# Patient Record
Sex: FEMALE | Race: BLACK OR AFRICAN AMERICAN | NOT HISPANIC OR LATINO | Employment: OTHER | URBAN - METROPOLITAN AREA
[De-identification: names, ages, dates, MRNs, and addresses within clinical notes are randomized per-mention and may not be internally consistent; named-entity substitution may affect disease eponyms.]

---

## 2021-10-13 ENCOUNTER — OFFICE VISIT (OUTPATIENT)
Dept: GASTROENTEROLOGY | Facility: CLINIC | Age: 71
End: 2021-10-13

## 2021-10-13 ENCOUNTER — PREP FOR SURGERY (OUTPATIENT)
Dept: SURGERY | Facility: SURGERY CENTER | Age: 71
End: 2021-10-13

## 2021-10-13 VITALS
HEIGHT: 64 IN | TEMPERATURE: 96.8 F | HEART RATE: 83 BPM | SYSTOLIC BLOOD PRESSURE: 130 MMHG | BODY MASS INDEX: 37.49 KG/M2 | OXYGEN SATURATION: 97 % | WEIGHT: 219.6 LBS | DIASTOLIC BLOOD PRESSURE: 80 MMHG

## 2021-10-13 DIAGNOSIS — R13.19 ESOPHAGEAL DYSPHAGIA: Primary | ICD-10-CM

## 2021-10-13 DIAGNOSIS — R10.84 GENERALIZED ABDOMINAL PAIN: ICD-10-CM

## 2021-10-13 PROCEDURE — 99204 OFFICE O/P NEW MOD 45 MIN: CPT | Performed by: NURSE PRACTITIONER

## 2021-10-13 RX ORDER — SODIUM CHLORIDE 0.9 % (FLUSH) 0.9 %
10 SYRINGE (ML) INJECTION AS NEEDED
Status: CANCELLED | OUTPATIENT
Start: 2021-10-13

## 2021-10-13 RX ORDER — LOSARTAN POTASSIUM AND HYDROCHLOROTHIAZIDE 12.5; 1 MG/1; MG/1
1 TABLET ORAL DAILY
COMMUNITY

## 2021-10-13 RX ORDER — ASCORBIC ACID 250 MG
250 TABLET ORAL DAILY
COMMUNITY

## 2021-10-13 RX ORDER — MELATONIN
1000 DAILY
COMMUNITY

## 2021-10-13 RX ORDER — THIAMINE HCL 100 MG
TABLET ORAL DAILY
COMMUNITY

## 2021-10-13 RX ORDER — FAMOTIDINE 40 MG/1
40 TABLET, FILM COATED ORAL DAILY
Qty: 30 TABLET | Refills: 5 | Status: SHIPPED | OUTPATIENT
Start: 2021-10-13 | End: 2022-04-07

## 2021-10-13 RX ORDER — SODIUM CHLORIDE, SODIUM LACTATE, POTASSIUM CHLORIDE, CALCIUM CHLORIDE 600; 310; 30; 20 MG/100ML; MG/100ML; MG/100ML; MG/100ML
30 INJECTION, SOLUTION INTRAVENOUS CONTINUOUS PRN
Status: CANCELLED | OUTPATIENT
Start: 2021-10-13

## 2021-10-13 RX ORDER — TOLTERODINE 4 MG/1
CAPSULE, EXTENDED RELEASE ORAL
Status: ON HOLD | COMMUNITY
Start: 2021-08-31 | End: 2021-12-21

## 2021-10-13 RX ORDER — SODIUM CHLORIDE 0.9 % (FLUSH) 0.9 %
3 SYRINGE (ML) INJECTION EVERY 12 HOURS SCHEDULED
Status: CANCELLED | OUTPATIENT
Start: 2021-10-13

## 2021-10-13 NOTE — PROGRESS NOTES
"No chief complaint on file.          History of Present Illness  Patient presents today for abdominal pain and sensation of food getting stuck in her upper chest.  She reports longstanding GI symptoms of abdominal pain in her mid abdomen described as a pain.  This comes and goes.  No specific food triggers.  No change with bowel habits.  No associated diarrhea or constipation.  Pain is nonspecific.  No associated nausea or vomiting.  Pain is more frequent.  Pain does not radiate.    She denies melena or hematochezia but can have diarrhea at times.    She also has difficulty swallowing.  This has been present for a wild but is worsening and becoming more often.  She has trouble swallowing solid foods and will have forced regurgitation a couple of times a month.  She does not have difficulty with liquids.  She will have regurgitation with belching as well.    She does not have epigastric pain or reflux symptoms.    She denies fever or chills.    Previous colonoscopy for colon cancer screening June 2014 was normal.      There is no family history of colon cancer or colon polyps.    She has been on ranitidine in the past but this was discontinued due to manufacture recall.  She did not notice any change in symptoms after discontinuing the medication.    She is up-to-date with her primary care provider.    Result Review :       PHYSICIAN PROGRESS NOTES - SCAN - [facility] 08/31/2021 (08/31/2021)       Vital Signs:   /80   Pulse 83   Temp 96.8 °F (36 °C)   Ht 162.6 cm (64\")   Wt 99.6 kg (219 lb 9.6 oz)   SpO2 97%   BMI 37.69 kg/m²     Body mass index is 37.69 kg/m².     Physical Exam  Vitals reviewed.   Constitutional:       General: She is not in acute distress.     Appearance: Normal appearance. She is not ill-appearing.   Pulmonary:      Effort: Pulmonary effort is normal. No respiratory distress.   Abdominal:      General: Bowel sounds are normal. There is no distension.      Palpations: Abdomen is soft. " Abdomen is not rigid. There is no mass or pulsatile mass.      Tenderness: There is no abdominal tenderness. There is no guarding or rebound.   Musculoskeletal:      Cervical back: No rigidity.   Lymphadenopathy:      Cervical: No cervical adenopathy.   Neurological:      Mental Status: She is alert.       Assessment and Plan    Diagnoses and all orders for this visit:    1. Esophageal dysphagia (Primary)    2. Generalized abdominal pain       For trouble swallowing, will start famotidine 40 mg once daily.  Will proceed with esophagram to delineate esophageal anatomy and proceed with EGD, orders placed.    For abdominal pain, diffuse, recommend colonoscopy.  Orders placed.    Based on EGD and colonoscopy findings, recommend follow-up visit for review of test results and make additional recommendations based on symptoms.    If esophagram shows a significant stricture, will separate EGD and colonoscopy.    Some modifications to help while you are having trouble swallowing include:   Avoiding highly textured foods such as meats and bulky foods such as bagels and breads.   Cutting food into smaller pieces.   Extensively chew foods.   Washing foods down with liquids and eating more slowly.  Also purposefully eating which includes avoiding distractions or talking while focusing on chewing completely and purposefully.    Patient verbalized agreement and understanding with the above plan, all questions answered and support provided.          Patient Instructions   For trouble swallowing, will start famotidine 40 mg once daily.  Will proceed with esophagram to delineate esophageal anatomy and proceed with EGD, orders placed.    For abdominal pain, diffuse, recommend colonoscopy.  Orders placed.    Based on EGD and colonoscopy findings, recommend follow-up visit for review of test results and make additional recommendations based on symptoms.    If esophagram shows a significant stricture, will separate EGD and  colonoscopy.    Some modifications to help while you are having trouble swallowing include:   Avoiding highly textured foods such as meats and bulky foods such as bagels and breads.   Cutting food into smaller pieces.   Extensively chew foods.   Washing foods down with liquids and eating more slowly.  Also purposefully eating which includes avoiding distractions or talking while focusing on chewing completely and purposefully.    Patient verbalized agreement and understanding with the above plan, all questions answered and support provided.          EMR Dragon/Transcription Disclaimer:  This document has been Dictated utilizing Dragon dictation.

## 2021-10-13 NOTE — PATIENT INSTRUCTIONS
For trouble swallowing, will start famotidine 40 mg once daily.  Will proceed with esophagram to delineate esophageal anatomy and proceed with EGD, orders placed.    For abdominal pain, diffuse, recommend colonoscopy.  Orders placed.    Based on EGD and colonoscopy findings, recommend follow-up visit for review of test results and make additional recommendations based on symptoms.    If esophagram shows a significant stricture, will separate EGD and colonoscopy.    Some modifications to help while you are having trouble swallowing include:   Avoiding highly textured foods such as meats and bulky foods such as bagels and breads.   Cutting food into smaller pieces.   Extensively chew foods.   Washing foods down with liquids and eating more slowly.  Also purposefully eating which includes avoiding distractions or talking while focusing on chewing completely and purposefully.    Patient verbalized agreement and understanding with the above plan, all questions answered and support provided.

## 2021-10-26 ENCOUNTER — HOSPITAL ENCOUNTER (OUTPATIENT)
Dept: GENERAL RADIOLOGY | Facility: HOSPITAL | Age: 71
Discharge: HOME | End: 2021-10-26
Admitting: NURSE PRACTITIONER

## 2021-10-26 ENCOUNTER — IMMUNIZATION (OUTPATIENT)
Dept: VACCINE CLINIC | Facility: HOSPITAL | Age: 71
End: 2021-10-26

## 2021-10-26 DIAGNOSIS — R10.84 GENERALIZED ABDOMINAL PAIN: ICD-10-CM

## 2021-10-26 DIAGNOSIS — R13.19 ESOPHAGEAL DYSPHAGIA: ICD-10-CM

## 2021-10-26 PROCEDURE — A9270 NON-COVERED ITEM OR SERVICE: HCPCS | Performed by: NURSE PRACTITIONER

## 2021-10-26 PROCEDURE — 63710000001 BARIUM SULFATE 98 % RECONSTITUTED SUSPENSION: Performed by: NURSE PRACTITIONER

## 2021-10-26 PROCEDURE — 91300 HC SARSCOV02 VAC 30MCG/0.3ML IM: CPT | Performed by: INTERNAL MEDICINE

## 2021-10-26 PROCEDURE — 63710000001 BARIUM SULFATE 700 MG TABLET: Performed by: NURSE PRACTITIONER

## 2021-10-26 PROCEDURE — 63710000001 BARIUM SULFATE 96 % RECONSTITUTED SUSPENSION: Performed by: NURSE PRACTITIONER

## 2021-10-26 PROCEDURE — 63710000001 SOD BICARB-CITRIC ACID-SIMETHICONE 2.21-1.53-0.04 G PACK: Performed by: NURSE PRACTITIONER

## 2021-10-26 PROCEDURE — 0004A ADM SARSCOV2 30MCG/0.3ML BOOSTER: CPT | Performed by: INTERNAL MEDICINE

## 2021-10-26 PROCEDURE — 74221 X-RAY XM ESOPHAGUS 2CNTRST: CPT

## 2021-10-26 RX ADMIN — ANTACID/ANTIFLATULENT 1 PACKET: 380; 550; 10; 10 GRANULE, EFFERVESCENT ORAL at 09:55

## 2021-10-26 RX ADMIN — BARIUM SULFATE 183 ML: 960 POWDER, FOR SUSPENSION ORAL at 09:55

## 2021-10-26 RX ADMIN — BARIUM SULFATE 135 ML: 980 POWDER, FOR SUSPENSION ORAL at 09:55

## 2021-10-26 RX ADMIN — BARIUM SULFATE 700 MG: 700 TABLET ORAL at 09:55

## 2021-12-16 NOTE — SIGNIFICANT NOTE
Education provided the Patient on the following:    - Nothing to Eat or Drink after MN the night before the procedure  -Your required COVID Test is Scheduled on 12/18/21 Between the Hours of [id number]  -You will only be notified if your COVID test Result is POSITIVE  -The importance of reducing your number of contacts by self quarantining after you COVID test until the date of your Colonoscopy and EGD    - Avoid red/purple fluids while completing their bowel prep as ordered by physician  -Contact Gastrointerologist office for any questions about specific details regarding colon prep    -You will need to have someone drive you home after your colonoscopy and remain with you for 24 hours after the procedure  - The date of your procedure, your are welcome to have one visitor at bedside or remain within 10-15 minutes of [facility]  -Please wear warm socks when you arrive for your procedure  -Remove all jewelry and leave any valuables before arriving the day of your procedure (all will have to be removed before leaving preop)  -You will need to arrive at  1030 12/21/21on procedure    -Feel free to contact us at: [phone] with any additional questions/concerns

## 2021-12-18 ENCOUNTER — LAB REQUISITION (OUTPATIENT)
Dept: LAB | Facility: HOSPITAL | Age: 71
End: 2021-12-18

## 2021-12-18 DIAGNOSIS — Z00.00 ENCOUNTER FOR GENERAL ADULT MEDICAL EXAMINATION WITHOUT ABNORMAL FINDINGS: ICD-10-CM

## 2021-12-18 LAB — SARS-COV-2 ORF1AB RESP QL NAA+PROBE: NOT DETECTED

## 2021-12-18 PROCEDURE — U0004 COV-19 TEST NON-CDC HGH THRU: HCPCS | Performed by: SURGERY

## 2021-12-18 PROCEDURE — U0005 INFEC AGEN DETEC AMPLI PROBE: HCPCS | Performed by: SURGERY

## 2021-12-21 ENCOUNTER — ANESTHESIA (OUTPATIENT)
Dept: SURGERY | Facility: SURGERY CENTER | Age: 71
End: 2021-12-21

## 2021-12-21 ENCOUNTER — ANESTHESIA EVENT (OUTPATIENT)
Dept: SURGERY | Facility: SURGERY CENTER | Age: 71
End: 2021-12-21

## 2021-12-21 ENCOUNTER — HOSPITAL ENCOUNTER (OUTPATIENT)
Facility: SURGERY CENTER | Age: 71
Setting detail: HOSPITAL OUTPATIENT SURGERY
Discharge: HOME | End: 2021-12-21
Attending: INTERNAL MEDICINE | Admitting: INTERNAL MEDICINE

## 2021-12-21 VITALS
HEIGHT: 64 IN | WEIGHT: 214 LBS | DIASTOLIC BLOOD PRESSURE: 70 MMHG | SYSTOLIC BLOOD PRESSURE: 151 MMHG | OXYGEN SATURATION: 98 % | HEART RATE: 69 BPM | BODY MASS INDEX: 36.54 KG/M2 | TEMPERATURE: 97.2 F | RESPIRATION RATE: 18 BRPM

## 2021-12-21 DIAGNOSIS — R10.84 GENERALIZED ABDOMINAL PAIN: ICD-10-CM

## 2021-12-21 DIAGNOSIS — R13.19 ESOPHAGEAL DYSPHAGIA: ICD-10-CM

## 2021-12-21 PROCEDURE — 45385 COLONOSCOPY W/LESION REMOVAL: CPT | Performed by: INTERNAL MEDICINE

## 2021-12-21 PROCEDURE — 45380 COLONOSCOPY AND BIOPSY: CPT | Performed by: INTERNAL MEDICINE

## 2021-12-21 PROCEDURE — 43249 ESOPH EGD DILATION <30 MM: CPT | Performed by: INTERNAL MEDICINE

## 2021-12-21 PROCEDURE — 0DBK8ZZ EXCISION OF ASCENDING COLON, VIA NATURAL OR ARTIFICIAL OPENING ENDOSCOPIC: ICD-10-PCS | Performed by: NURSE PRACTITIONER

## 2021-12-21 PROCEDURE — 0D748ZZ DILATION OF ESOPHAGOGASTRIC JUNCTION, VIA NATURAL OR ARTIFICIAL OPENING ENDOSCOPIC: ICD-10-PCS | Performed by: NURSE PRACTITIONER

## 2021-12-21 PROCEDURE — 25010000002 PROPOFOL 10 MG/ML EMULSION: Performed by: ANESTHESIOLOGY

## 2021-12-21 PROCEDURE — 88305 TISSUE EXAM BY PATHOLOGIST: CPT | Performed by: INTERNAL MEDICINE

## 2021-12-21 PROCEDURE — C1726 CATH, BAL DIL, NON-VASCULAR: HCPCS | Performed by: INTERNAL MEDICINE

## 2021-12-21 RX ORDER — LIDOCAINE HYDROCHLORIDE 20 MG/ML
INJECTION, SOLUTION INFILTRATION; PERINEURAL AS NEEDED
Status: DISCONTINUED | OUTPATIENT
Start: 2021-12-21 | End: 2021-12-21 | Stop reason: SURG

## 2021-12-21 RX ORDER — SODIUM CHLORIDE, SODIUM LACTATE, POTASSIUM CHLORIDE, CALCIUM CHLORIDE 600; 310; 30; 20 MG/100ML; MG/100ML; MG/100ML; MG/100ML
1000 INJECTION, SOLUTION INTRAVENOUS CONTINUOUS
Status: DISCONTINUED | OUTPATIENT
Start: 2021-12-21 | End: 2021-12-21 | Stop reason: HOSPADM

## 2021-12-21 RX ORDER — SODIUM CHLORIDE 0.9 % (FLUSH) 0.9 %
3 SYRINGE (ML) INJECTION EVERY 12 HOURS SCHEDULED
Status: DISCONTINUED | OUTPATIENT
Start: 2021-12-21 | End: 2021-12-21 | Stop reason: HOSPADM

## 2021-12-21 RX ORDER — LIDOCAINE HYDROCHLORIDE 10 MG/ML
0.5 INJECTION, SOLUTION INFILTRATION; PERINEURAL ONCE AS NEEDED
Status: DISCONTINUED | OUTPATIENT
Start: 2021-12-21 | End: 2021-12-21 | Stop reason: HOSPADM

## 2021-12-21 RX ORDER — SODIUM CHLORIDE, SODIUM LACTATE, POTASSIUM CHLORIDE, CALCIUM CHLORIDE 600; 310; 30; 20 MG/100ML; MG/100ML; MG/100ML; MG/100ML
30 INJECTION, SOLUTION INTRAVENOUS CONTINUOUS PRN
Status: DISCONTINUED | OUTPATIENT
Start: 2021-12-21 | End: 2021-12-21 | Stop reason: HOSPADM

## 2021-12-21 RX ORDER — SODIUM CHLORIDE 0.9 % (FLUSH) 0.9 %
10 SYRINGE (ML) INJECTION AS NEEDED
Status: DISCONTINUED | OUTPATIENT
Start: 2021-12-21 | End: 2021-12-21 | Stop reason: HOSPADM

## 2021-12-21 RX ORDER — MAGNESIUM HYDROXIDE 1200 MG/15ML
LIQUID ORAL AS NEEDED
Status: DISCONTINUED | OUTPATIENT
Start: 2021-12-21 | End: 2021-12-21 | Stop reason: HOSPADM

## 2021-12-21 RX ORDER — PROPOFOL 10 MG/ML
VIAL (ML) INTRAVENOUS AS NEEDED
Status: DISCONTINUED | OUTPATIENT
Start: 2021-12-21 | End: 2021-12-21 | Stop reason: SURG

## 2021-12-21 RX ADMIN — SODIUM CHLORIDE, POTASSIUM CHLORIDE, SODIUM LACTATE AND CALCIUM CHLORIDE 1000 ML: 600; 310; 30; 20 INJECTION, SOLUTION INTRAVENOUS at 11:24

## 2021-12-21 RX ADMIN — PROPOFOL INJECTABLE EMULSION 100 MCG/KG/MIN: 10 INJECTION, EMULSION INTRAVENOUS at 12:24

## 2021-12-21 RX ADMIN — LIDOCAINE HYDROCHLORIDE 100 MG: 20 INJECTION, SOLUTION INFILTRATION; PERINEURAL at 12:24

## 2021-12-21 RX ADMIN — PROPOFOL 100 MG: 10 INJECTION, EMULSION INTRAVENOUS at 12:24

## 2021-12-21 NOTE — ANESTHESIA POSTPROCEDURE EVALUATION
Patient: Alejandra Shafer    Procedure Summary     Date: 12/21/21 Room / Location: [facility] / [facility]    Anesthesia Start: 1222 Anesthesia Stop: 1253    Procedures:       ESOPHAGOGASTRODUODENOSCOPY WITH DILATATION (N/A )      COLONOSCOPY WITH POLYPECTOMY (N/A ) Diagnosis:       Esophageal dysphagia      Generalized abdominal pain      (Esophageal dysphagia [R13.19])      (Generalized abdominal pain [R10.84])    Surgeons: Luis Antonio Shah MD Provider: Noe Celeste MD    Anesthesia Type: MAC ASA Status: 2          Anesthesia Type: MAC    Vitals  No vitals data found for the desired time range.          Post Anesthesia Care and Evaluation    Patient location during evaluation: PHASE II  Anesthetic complications: No anesthetic complications

## 2021-12-21 NOTE — ANESTHESIA PREPROCEDURE EVALUATION
Anesthesia Evaluation     Nursing notes reviewed   no history of anesthetic complications:               Airway   Mallampati: III  TM distance: >3 FB  Neck ROM: full  Dental      Pulmonary - normal exam   (-) not a smoker  Cardiovascular - normal exam    (+) hypertension,   (-) angina      Neuro/Psych  GI/Hepatic/Renal/Endo    (+) obesity,  GERD,      ROS Comment: EGD and colonoscopy due to dysphagia and for screening.    Musculoskeletal     Abdominal    Substance History      OB/GYN          Other                      Anesthesia Plan    ASA 2     MAC       Anesthetic plan, all risks, benefits, and alternatives have been provided, discussed and informed consent has been obtained with: patient.

## 2021-12-21 NOTE — H&P
No chief complaint on file.      HPI  Patient today for an EGD and colonoscopy due to dysphagia and for screening.         Problem List:    Patient Active Problem List   Diagnosis   • Esophageal dysphagia   • Generalized abdominal pain       Medical History:    Past Medical History:   Diagnosis Date   • GERD (gastroesophageal reflux disease)    • Hypertension         Social History:    Social History     Socioeconomic History   • Marital status:    Tobacco Use   • Smoking status: Never Smoker   • Smokeless tobacco: Never Used   Substance and Sexual Activity   • Alcohol use: Yes     Comment: Occ    • Drug use: Never       Family History:   Family History   Problem Relation Age of Onset   • Colon cancer Neg Hx    • Colon polyps Neg Hx    • Crohn's disease Neg Hx    • Irritable bowel syndrome Neg Hx    • Ulcerative colitis Neg Hx        Surgical History:   Past Surgical History:   Procedure Laterality Date   • COLONOSCOPY      4 years   • HYSTERECTOMY           Current Facility-Administered Medications:   •  lactated ringers infusion 1,000 mL, 1,000 mL, Intravenous, Continuous, Luis Antonio Shah MD  •  lidocaine (XYLOCAINE) 1 % injection 0.5 mL, 0.5 mL, Intradermal, Once PRN, Luis Antonio Shah MD  •  sodium chloride 0.9 % flush 10 mL, 10 mL, Intravenous, PRN, Michaela Christiansen APRN  •  sodium chloride 0.9 % flush 3 mL, 3 mL, Intravenous, Q12H, Michaela Christiansen APRN    Allergies:   Allergies   Allergen Reactions   • Penicillins Hives        The following portions of the patient's history were reviewed by me and updated as appropriate: review of systems, allergies, current medications, past family history, past medical history, past social history, past surgical history and problem list.    There were no vitals filed for this visit.    PHYSICAL EXAM:    CONSTITUTIONAL:  today's vital signs reviewed by me  GASTROINTESTINAL: abdomen is soft nontender nondistended with normal active bowel sounds, no masses  are appreciated    Assessment/ Plan  Proceed today with EGD and colonoscopy.    Risks and benefits as well as alternatives to endoscopic evaluation were explained to the patient and they voiced understanding and wish to proceed.  These risks include but are not limited to the risk of bleeding, perforation, adverse reaction to sedation, and missed lesions.  The patient was given the opportunity to ask questions prior to the endoscopic procedure.

## 2021-12-22 LAB
LAB AP CASE REPORT: NORMAL
LAB AP CLINICAL INFORMATION: NORMAL
PATH REPORT.FINAL DX SPEC: NORMAL
PATH REPORT.GROSS SPEC: NORMAL

## 2022-04-06 DIAGNOSIS — R13.19 ESOPHAGEAL DYSPHAGIA: ICD-10-CM

## 2022-04-06 DIAGNOSIS — R10.84 GENERALIZED ABDOMINAL PAIN: ICD-10-CM

## 2022-04-07 RX ORDER — FAMOTIDINE 40 MG/1
TABLET, FILM COATED ORAL
Qty: 30 TABLET | Refills: 5 | Status: SHIPPED | OUTPATIENT
Start: 2022-04-07

## (undated) DEVICE — BITEBLOCK OMNI BLOC

## (undated) DEVICE — SINGLE-USE BIOPSY FORCEPS: Brand: RADIAL JAW 4

## (undated) DEVICE — CANN NASL CO2 TRULINK W/O2 A/

## (undated) DEVICE — GOWN ISOL W/THUMB UNIV BLU BX/15

## (undated) DEVICE — KT ORCA ORCAPOD DISP STRL

## (undated) DEVICE — GOWN ,SIRUS,NONREINFORCED 3XL: Brand: MEDLINE

## (undated) DEVICE — VIAL FORMLN CAP 10PCT 20ML

## (undated) DEVICE — INTEGRATED INFLATION/LITHO DEVICE: Brand: ALLIANCE II

## (undated) DEVICE — SINGLE-USE POLYPECTOMY SNARE: Brand: SENSATION SHORT THROW

## (undated) DEVICE — ESOPHAGEAL BALLOON DILATATION CATHETER: Brand: CRE FIXED WIRE

## (undated) DEVICE — Device

## (undated) DEVICE — FLEX ADVANTAGE 1500CC: Brand: FLEX ADVANTAGE